# Patient Record
(demographics unavailable — no encounter records)

---

## 2024-12-23 NOTE — END OF VISIT
[FreeTextEntry3] : A physician assistant/resident assisted with documenting the visit and acted as a scribe. I have seen and examined the patient, made my assessment and plan and have made all modifications necessary to the note.  Yesenia Swartz MD Pediatric Orthopaedics Surgery [facility]
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
## 2024-12-23 NOTE — REASON FOR VISIT
[Follow Up] : a follow up visit [Patient] : patient [Mother] : mother [FreeTextEntry1] : left leg injury

## 2024-12-23 NOTE — PHYSICAL EXAM
[FreeTextEntry1] : Gait: Presents being carried by mother, NWB on the lower extremities  GENERAL: alert, cooperative, in NAD SKIN: The skin is intact, warm, pink and dry over the area examined. EYES: Normal conjunctiva, normal eyelids and pupils were equal and round. ENT: normal ears, normal nose and normal lips. CARDIOVASCULAR: brisk capillary refill, but no peripheral edema. RESPIRATORY: The patient is in no apparent respiratory distress. They're taking full deep breaths without use of accessory muscles or evidence of audible wheezes or stridor without the use of a stethoscope. Normal respiratory effort.  E  Long leg cast is clean, dry, and intact.  Cast is fitting well with no signs of being loose or tight.  No irritations or abrasions seen around cast edges.  Able to move toes freely.  EHL/ FHL intact  Sensation grossly intact.  Brisk capillary refill distally.
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
## 2024-12-23 NOTE — DATA REVIEWED
[de-identified] : X-rays of the left lower extremity performed [facility] [place] [facility] on 12/16/2024 reviewed, x-rays reveal bilateral corner fractures of the proximal tibia, as well as a small fibula buckle fracture.

## 2024-12-23 NOTE — REVIEW OF SYSTEMS
[Change in Activity] : change in activity [Appropriate Age Development] : development appropriate for age [Fever Above 102] : no fever [Itching] : no itching [Redness] : no redness [Sore Throat] : no sore throat [Wheezing] : no wheezing [Asthma] : no asthma [Joint Pains] : no arthralgias
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
## 2025-01-07 NOTE — DATA REVIEWED
[de-identified] : 3 views left knee OOC radiographs were ordered, obtained, and independently reviewed in clinic on 01/07/2025 depicting nondisplaced bilateral corner fractures of the proximal tibia, as well as a small fibula buckle fracture with acceptable alignment for age. Signs of interval healing. Skeletally immature individual.  [place] [facility] [place] reviewed, x-rays reveal bilateral corner fractures of the proximal tibia, as well as a small fibula buckle fracture.

## 2025-01-07 NOTE — END OF VISIT
[FreeTextEntry3] : A physician assistant/resident assisted with documenting the visit and acted as a scribe. I have seen and examined the patient, made my assessment and plan and have made all modifications necessary to the note.  Yesenia Swartz MD Pediatric Orthopaedics Surgery [facility]
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
## 2025-01-07 NOTE — PHYSICAL EXAM
[FreeTextEntry1] : Gait: Presents being carried by father, NWYSABEL on the lower extremities  GENERAL: alert, cooperative, in NAD SKIN: The skin is intact, warm, pink and dry over the area examined. EYES: Normal conjunctiva, normal eyelids and pupils were equal and round. ENT: normal ears, normal nose and normal lips. CARDIOVASCULAR: brisk capillary refill, but no peripheral edema. RESPIRATORY: The patient is in no apparent respiratory distress. They're taking full deep breaths without use of accessory muscles or evidence of audible wheezes or stridor without the use of a stethoscope. Normal respiratory effort.  LLE  - Long leg cast was removed today - No underlying skin irritation or breakdown, dry skin noted diffusely at _ - No gross deformity - No swelling and no tenderness over the fracture site - Mild stiffness noted due to immobilization - Limited ROM - No swelling about the toes - Able to fully flex and extend all toes without discomfort - Toes are warm and appear well perfused with brisk capillary refill - Sensation is grossly intact - No evidence of lymphedema.
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
## 2025-02-04 NOTE — END OF VISIT
[FreeTextEntry3] : A physician assistant/resident assisted with documenting the visit and acted as a scribe. I have seen and examined the patient, made my assessment and plan and have made all modifications necessary to the note.  Yesenia Swartz MD Pediatric Orthopaedics Surgery [facility]

## 2025-02-04 NOTE — PHYSICAL EXAM
[FreeTextEntry1] : Gait: Patient ambulated to the exam room with slight limp  GENERAL: alert, cooperative, in NAD SKIN: The skin is intact, warm, pink and dry over the area examined. EYES: Normal conjunctiva, normal eyelids and pupils were equal and round. ENT: normal ears, normal nose and normal lips. CARDIOVASCULAR: brisk capillary refill, but no peripheral edema. RESPIRATORY: The patient is in no apparent respiratory distress. They're taking full deep breaths without use of accessory muscles or evidence of audible wheezes or stridor without the use of a stethoscope. Normal respiratory effort.  LLE  - No underlying skin irritation or breakdown - No gross deformity - No swelling and no tenderness over the fracture site - Full range of motion of the knee  - No swelling about the toes - Able to fully flex and extend all toes without discomfort - Toes are warm and appear well perfused with brisk capillary refill - Sensation is grossly intact - No evidence of lymphedema.

## 2025-02-04 NOTE — DATA REVIEWED
[de-identified] : XRs left knee 3 views performed today 2/4/25: ondisplaced bilateral corner fractures of the proximal tibia, as well as a small fibula buckle fracture with acceptable alignment for age. Signs of interval healing. Skeletally immature   3 views left knee OOC radiographs were ordered, obtained, and independently reviewed in clinic on 01/07/2025 depicting nondisplaced bilateral corner fractures of the proximal tibia, as well as a small fibula buckle fracture with acceptable alignment for age. Signs of interval healing. Skeletally immature individual.  X-rays of the left lower extremity performed at [facility] [place] ED on 12/16/2024 reviewed, x-rays reveal bilateral corner fractures of the proximal tibia, as well as a small fibula buckle fracture.